# Patient Record
Sex: FEMALE | Race: WHITE | NOT HISPANIC OR LATINO
[De-identification: names, ages, dates, MRNs, and addresses within clinical notes are randomized per-mention and may not be internally consistent; named-entity substitution may affect disease eponyms.]

---

## 2023-10-23 ENCOUNTER — APPOINTMENT (OUTPATIENT)
Dept: ORTHOPEDIC SURGERY | Facility: CLINIC | Age: 53
End: 2023-10-23

## 2024-01-11 ENCOUNTER — TRANSCRIPTION ENCOUNTER (OUTPATIENT)
Age: 54
End: 2024-01-11

## 2024-05-13 PROBLEM — Z00.00 ENCOUNTER FOR PREVENTIVE HEALTH EXAMINATION: Status: ACTIVE | Noted: 2024-05-13

## 2024-05-15 ENCOUNTER — APPOINTMENT (OUTPATIENT)
Dept: GYNECOLOGIC ONCOLOGY | Facility: CLINIC | Age: 54
End: 2024-05-15
Payer: COMMERCIAL

## 2024-05-15 VITALS
WEIGHT: 170 LBS | HEIGHT: 65 IN | BODY MASS INDEX: 28.32 KG/M2 | DIASTOLIC BLOOD PRESSURE: 68 MMHG | SYSTOLIC BLOOD PRESSURE: 128 MMHG

## 2024-05-15 DIAGNOSIS — N83.209 UNSPECIFIED OVARIAN CYST, UNSPECIFIED SIDE: ICD-10-CM

## 2024-05-15 DIAGNOSIS — R89.7 ABNORMAL HISTOLOGICAL FINDINGS IN SPECIMENS FROM OTHER ORGANS, SYSTEMS AND TISSUES: ICD-10-CM

## 2024-05-15 DIAGNOSIS — Z78.9 OTHER SPECIFIED HEALTH STATUS: ICD-10-CM

## 2024-05-15 DIAGNOSIS — Z87.891 PERSONAL HISTORY OF NICOTINE DEPENDENCE: ICD-10-CM

## 2024-05-15 PROCEDURE — G2211 COMPLEX E/M VISIT ADD ON: CPT

## 2024-05-15 PROCEDURE — 99204 OFFICE O/P NEW MOD 45 MIN: CPT

## 2024-05-15 RX ORDER — TAMOXIFEN CITRATE 20 MG/1
TABLET, FILM COATED ORAL
Refills: 0 | Status: ACTIVE | COMMUNITY

## 2024-05-15 NOTE — ASSESSMENT
[FreeTextEntry1] : 53 yo female with recent diagnosis of primary appendiceal cancer now with R ovarian/adnexal cyst measuring 6.0 cm.

## 2024-05-15 NOTE — DISCUSSION/SUMMARY
[FreeTextEntry1] : Discussed with patient ovaries promote glandular development and therefore primary appendiceal cancers can spread to ovary. For this reason, I believe surgical intervention is warranted. I would recommend at least removal of both fallopian tubes and right ovary. Further discussion of surgical options including BSO or hysterectomy BSO was had. We know most of the benefit from ovaries are up to age 52 but can see additional benefits up to age 65, therefore we do try to leave at least one ovary in place if possible. However, due to her known malignancy and the possibility that this ovarian cyst is a metastatic site it is reasonable to consider removal of both ovaries. With this in mind, options could include frozen section at time of surgery, should frozen section result as malignant she could opt to proceed with additional surgery at that time. It is also possible frozen section could result as benign and surgery could be stopped at O, however patient is aware this is not definitive and final pathology may result in a malignancy which would lead to subsequent surgery for staging. She could also consider hysterectomy as she is at increased risk of uterine cancer/sarcoma due to her tamoxifen use. Patient is vacationing to [place] this week and will think over her options, she will contact my office next week with final decision.

## 2024-05-15 NOTE — PHYSICAL EXAM
[Chaperone Present] : A chaperone was present in the examining room during all aspects of the physical examination [Normal] : Mood and affect: Normal [FreeTextEntry2] : Flori Altamirano PA-C [de-identified] : GYN DEFERRED

## 2024-05-15 NOTE — END OF VISIT
[FreeTextEntry3] : I, Dr. Dick Arriaza, personally performed the evaluation and management of (E/M) services for this new patient. That E/M includes conducting the initial examination, assessing all conditions, and establishing the plan of care. Today, Flori Altamirano PA-C, was here to observe my evaluation and management services for this patient to be followed going forward.

## 2024-05-15 NOTE — CHIEF COMPLAINT
[Spouse] : spouse [FreeTextEntry1] : [facility] [place] Office [address] [place] [address]

## 2024-05-15 NOTE — PLAN
[TextEntry] : Will be present at 5/30 surgery @ [facility] Patient to call office with decision of surgery (RA laparoscopic BS RO vs laparoscopic BSO vs hysterectomy/BSO) Consent to be signed at the time of pt decision Discontinue tamoxifen use 1 week prior to surgery

## 2024-05-15 NOTE — HISTORY OF PRESENT ILLNESS
[FreeTextEntry1] : 55 yo  via 2C/S LMP 2023 referred by Dr Messer for surgical consultation of adnexal mass. Patient presented to [facility] with c/o lower abdominal pain x 1 day with nausea.    24 CT AP unremarkable appearance of the uterus and left ovary. There is a 3.0 x 4.9 x 5.7 cm right ovarian cyst with layering debris, likely a hemorrhagic cyst. Dilated fluid filled appendix with an approximately 1.2 cm area of wall thickening and hyperenhancement near the ostium at the site of obstruction, which could be infection/inflammatory in etiology or represent a small mass. There is only mild fat stranding surrounding the dilated appendix. No evidence of perforation.     24 US: Right ovarian cyst with fluid level/complexity measures 3.5 x 3.7 x 4.3 cm. Limited vascular flow of the right ovary is identified, possibly due to presence of the cyst and resultant thinning of the surrounding ovarian parenchyma, or possibly due to postmenopausal status. Correlation with GYN evaluation and pelvic MRI as feasible. Correlate for any clinical signs and symptoms of ovarian torsion-distortion. Nonspecific 4 mm endometrial cystic focus of unclear significance. GYN evaluation is suggested.   Patient underwent emergency laparoscopic appendectomy. Pathology with goblet cell adenocarcinoma, acute appendicitis. She is planned for laparoscopic R colectomy with Dr Messer at [facility] 24. She has colonoscopy scheduled for . Staging CT CAP was performed 5/3/24 results as follows; no adenopathy in the thorax, abdomen or pelvis, 3 mm right middle lobe lung nodule, stable. increase in size of the right ovarian or adnexal cyst containing layering debris measuring 6.0 cm. Interval appendectomy. Right lobe thyroid nodules. Correlation with thyroid sonography suggested. Thyroid US has been ordered by her PCP, patient has not yet had it done.   CA-125 24 (8)  Of note, patient follows with Dr Delacruz of [facility] (breast surgeon) for history of precancer of the breast, she has been on tamoxifen x 3 years. Breast imaging is ordered by Dr Delacruz.  Colonoscopy: 5 years ago WNL per pt, scheduled for

## 2024-05-20 ENCOUNTER — NON-APPOINTMENT (OUTPATIENT)
Age: 54
End: 2024-05-20

## 2024-05-30 ENCOUNTER — RESULT REVIEW (OUTPATIENT)
Age: 54
End: 2024-05-30

## 2024-06-05 ENCOUNTER — NON-APPOINTMENT (OUTPATIENT)
Age: 54
End: 2024-06-05

## 2024-06-13 ENCOUNTER — APPOINTMENT (OUTPATIENT)
Dept: GYNECOLOGIC ONCOLOGY | Facility: CLINIC | Age: 54
End: 2024-06-13
Payer: COMMERCIAL

## 2024-06-13 VITALS — SYSTOLIC BLOOD PRESSURE: 122 MMHG | DIASTOLIC BLOOD PRESSURE: 72 MMHG

## 2024-06-13 PROCEDURE — 99024 POSTOP FOLLOW-UP VISIT: CPT

## 2024-06-13 NOTE — DISCUSSION/SUMMARY
[Clean] : was clean [Dry] : was dry [Intact] : was intact [Erythema] : was not erythematous [Ecchymosis] : was not ecchymotic [Seroma] : had no seroma [None] : had no drainage [Normal Skin] : normal appearance [Firm] : soft [Tender] : nontender [Abnormal Bowel Sounds] : normal bowel sounds [Rebound] : no rebound tenderness [Guarding] : no guarding [Incisional Hernia] : no incisional hernia [Mass] : no palpable mass [Doing Well] : is doing well [Excellent Pain Control] : has excellent pain control [No Sign of Infection] : is showing no signs of infection [FreeTextEntry1] : Findings: Apical prolapse of uterus with cervix coming to hymenal ring. uterus sounded to 10 cm, dense fibromuscular adhesions along the entire pelvis anterior to the uterus (10x 8 cm). Normal left ovary and bilateral fallopian tubes, 3 cm right ovarian mass. No concerning lesions for metastatic disease. Normal upper abdomen. omental adhesion to the anterior abdominal wall and uterine fundus. 6 o'clock vaginal laceration 1 cm repaired with 3-0 Vicryl. Cystoscopy normal bilateral ureteral jets, no injury to the bladder, no lesions or sutures in the bladder. Please see Dr Messer's dictation for his portion of the dictation.    Surgical Pathology Report Diagnosis A. Uterus, cervix, bilateral fallopian tubes and ovaries, hysterectomy and bilateral salpin go-oophorectomy: Cervix: *   Focal mild chronic inflammation. Endometrium: *   Weakly proliferative pattern. Myometrium:  *  Leiomyomata. *  Subserosal endometriosis with associated features compatible with serosal fibrous adhe wing. *  Focal subserosal endosalpingiosis. *  Adenomyosis. Ovaries: *   Right ovary with cystic follicle and focal changes compatible with prior abdominal/pelvic procedure. *   Left ovary with endometriosis with focal ciliated metaplasia. Fallopian tubes: *   Bilateral fallopian tubes with no significant pathologic diagnosis. No malignancy identified in part A.  B. Portion of colon and terminal ileum, ileocolectomy: *   Favor 1 of 12 lymph nodes positive for adenocarcinoma (see comment). *   Colon and terminal ileum negative for primary tumor. *   Status post prior appendectomy, with reactive changes at appendectomy site.  Comment Part B: One of the lymph nodes in part B contains a small focus of mildly atypical glandular ep ithelium with adjacent clusters of cohesive cells without lumens, all present within the subcap sular sinus of the lymph node. Upon comparison, this epithelium of interest is roughly similar in appearance to the tumor in this patient's prior appendectomy specimen (accession # [id number]). The possibility of the epithelium in the current lymph node representing a benign finding (such as an artifactual specimen contaminant from gross dissection or a benign gland displaced by the prior appendectomy) was considered. However, in light of the overall atypia, morphology similar to the primary tumor, and the location of this epithelium within the subcapsular sinus of the lymph node; lymph node involvement by adenocarcinoma is favored.

## 2024-06-13 NOTE — END OF VISIT
[FreeTextEntry3] : RTC in 4 weeks for cuff check [FreeTextEntry2] :  JANAK Govea was present the entire duration of the patient interaction and gynecological exam.

## 2024-06-13 NOTE — REASON FOR VISIT
[Post Op] : post op visit [Other ___] : [unfilled] [de-identified] : 05/30/2024                     [de-identified] : patient is 2W0D today her for post op visit.  According to note (6/5/24) Patient reports that she is recovering well thus far. Her pain is well controlled with occasional tylenol and ibuprofen. She is tolerating a regular diet and voiding without difficulty. She is ambulating about the house without any issues. She reports incisions to be healing well without any signs of infection. She denies any fevers, SOB, CP, N/V/D, heavy VB and/or calf pain/swelling.   Please review the center pathology report  [de-identified] : ADEN TLSOUMYA BSO, uterosacral ligament fixation, cystoscopy

## 2024-06-13 NOTE — ASSESSMENT
[FreeTextEntry1] : Pt is a 55 yo with appendiceal adenocarcinoma who had a right ovarian mass and had concurrent hysterectomy, BSO at the time of hemicolectomy. Recovering well. She will need follow up with medical oncology given spread of cancer to lymph node. Recovering well surgically.

## 2024-07-11 ENCOUNTER — APPOINTMENT (OUTPATIENT)
Dept: GYNECOLOGIC ONCOLOGY | Facility: CLINIC | Age: 54
End: 2024-07-11
Payer: COMMERCIAL

## 2024-07-11 VITALS
SYSTOLIC BLOOD PRESSURE: 120 MMHG | WEIGHT: 170 LBS | HEIGHT: 65 IN | BODY MASS INDEX: 28.32 KG/M2 | DIASTOLIC BLOOD PRESSURE: 72 MMHG

## 2024-07-11 PROCEDURE — 99024 POSTOP FOLLOW-UP VISIT: CPT
